# Patient Record
Sex: MALE | Race: OTHER | Employment: FULL TIME | URBAN - METROPOLITAN AREA
[De-identification: names, ages, dates, MRNs, and addresses within clinical notes are randomized per-mention and may not be internally consistent; named-entity substitution may affect disease eponyms.]

---

## 2021-06-17 ENCOUNTER — OFFICE VISIT (OUTPATIENT)
Dept: INTERNAL MEDICINE CLINIC | Facility: CLINIC | Age: 26
End: 2021-06-17
Payer: COMMERCIAL

## 2021-06-17 VITALS
HEIGHT: 60 IN | SYSTOLIC BLOOD PRESSURE: 122 MMHG | TEMPERATURE: 99 F | WEIGHT: 210.69 LBS | HEART RATE: 87 BPM | RESPIRATION RATE: 14 BRPM | BODY MASS INDEX: 41.37 KG/M2 | OXYGEN SATURATION: 98 % | DIASTOLIC BLOOD PRESSURE: 76 MMHG

## 2021-06-17 DIAGNOSIS — M41.9 SCOLIOSIS OF THORACOLUMBAR SPINE, UNSPECIFIED SCOLIOSIS TYPE: ICD-10-CM

## 2021-06-17 DIAGNOSIS — R22.9 MULTIPLE SKIN NODULES: Primary | ICD-10-CM

## 2021-06-17 DIAGNOSIS — E78.5 HYPERLIPIDEMIA, UNSPECIFIED HYPERLIPIDEMIA TYPE: ICD-10-CM

## 2021-06-17 DIAGNOSIS — Z00.00 PHYSICAL EXAM, ANNUAL: ICD-10-CM

## 2021-06-17 PROCEDURE — 99204 OFFICE O/P NEW MOD 45 MIN: CPT | Performed by: INTERNAL MEDICINE

## 2021-06-17 PROCEDURE — 3078F DIAST BP <80 MM HG: CPT | Performed by: INTERNAL MEDICINE

## 2021-06-17 PROCEDURE — 3008F BODY MASS INDEX DOCD: CPT | Performed by: INTERNAL MEDICINE

## 2021-06-17 PROCEDURE — 3074F SYST BP LT 130 MM HG: CPT | Performed by: INTERNAL MEDICINE

## 2021-06-17 NOTE — PROGRESS NOTES
Martinez Schrader    CHIEF COMPLAINT:  Patient presents with:  Derm Problem: Small bumps on back. - No color No itching        HISTORY OF PRESENT ILLNESS:  The patient is a 27year old year old male who presents to establish care.  New pt to office tosanya Smoking status: Never Smoker      Smokeless tobacco: Never Used    Vaping Use      Vaping Use: Never used    Substance and Sexual Activity      Alcohol use: Yes        Comment: Once per week      Drug use: Never      Sexual activity: Not on file    Other REVIEW OF SYSTEMS:  GENERAL: feels well otherwise  SKIN: see hpi  EYES:denies blurred vision or double vision  HEENT: denies nasal congestion, sinus pain or ST  LUNGS: denies shortness of breath with exertion  CARDIOVASCULAR: denies chest pain on ex back.   - US BACK LOWER/ABD WALL(CPT=76705); Future    2. Scoliosis of thoracolumbar spine, unspecified scoliosis type  - XR SCOLIOSIS SPINE 1 VIEW SH(CPT=72081); Future    3.  Physical exam, annual  Do labs prior to cpx.   - CBC WITH DIFFERENTIAL WITH PLAT

## 2021-06-23 ENCOUNTER — MED REC SCAN ONLY (OUTPATIENT)
Dept: INTERNAL MEDICINE CLINIC | Facility: CLINIC | Age: 26
End: 2021-06-23

## 2021-07-14 ENCOUNTER — HOSPITAL ENCOUNTER (OUTPATIENT)
Dept: ULTRASOUND IMAGING | Age: 26
Discharge: HOME | End: 2021-07-14
Attending: INTERNAL MEDICINE
Payer: COMMERCIAL

## 2021-07-14 DIAGNOSIS — R22.9 MULTIPLE SKIN NODULES: ICD-10-CM

## 2021-07-14 PROCEDURE — 76705 ECHO EXAM OF ABDOMEN: CPT | Performed by: INTERNAL MEDICINE

## 2021-08-09 ENCOUNTER — HOSPITAL ENCOUNTER (OUTPATIENT)
Dept: GENERAL RADIOLOGY | Age: 26
Discharge: HOME | End: 2021-08-09
Attending: INTERNAL MEDICINE
Payer: COMMERCIAL

## 2021-08-09 ENCOUNTER — OFFICE VISIT (OUTPATIENT)
Dept: INTERNAL MEDICINE CLINIC | Facility: CLINIC | Age: 26
End: 2021-08-09
Payer: COMMERCIAL

## 2021-08-09 ENCOUNTER — LAB ENCOUNTER (OUTPATIENT)
Dept: LAB | Age: 26
End: 2021-08-09
Attending: INTERNAL MEDICINE
Payer: COMMERCIAL

## 2021-08-09 VITALS
HEIGHT: 67.5 IN | DIASTOLIC BLOOD PRESSURE: 70 MMHG | RESPIRATION RATE: 12 BRPM | TEMPERATURE: 98 F | BODY MASS INDEX: 32.89 KG/M2 | WEIGHT: 212 LBS | HEART RATE: 64 BPM | SYSTOLIC BLOOD PRESSURE: 124 MMHG

## 2021-08-09 DIAGNOSIS — M41.9 SCOLIOSIS OF THORACOLUMBAR SPINE, UNSPECIFIED SCOLIOSIS TYPE: ICD-10-CM

## 2021-08-09 DIAGNOSIS — Z00.00 PHYSICAL EXAM, ANNUAL: Primary | ICD-10-CM

## 2021-08-09 DIAGNOSIS — Z23 NEED FOR VACCINATION: ICD-10-CM

## 2021-08-09 DIAGNOSIS — E78.5 HYPERLIPIDEMIA, UNSPECIFIED HYPERLIPIDEMIA TYPE: ICD-10-CM

## 2021-08-09 DIAGNOSIS — Z00.00 PHYSICAL EXAM, ANNUAL: ICD-10-CM

## 2021-08-09 LAB
ALBUMIN SERPL-MCNC: 4.3 G/DL (ref 3.4–5)
ALBUMIN/GLOB SERPL: 1.2 {RATIO} (ref 1–2)
ALP LIVER SERPL-CCNC: 75 U/L
ALT SERPL-CCNC: 37 U/L
ANION GAP SERPL CALC-SCNC: 2 MMOL/L (ref 0–18)
AST SERPL-CCNC: 20 U/L (ref 15–37)
BASOPHILS # BLD AUTO: 0.05 X10(3) UL (ref 0–0.2)
BASOPHILS NFR BLD AUTO: 0.9 %
BILIRUB SERPL-MCNC: 0.4 MG/DL (ref 0.1–2)
BUN BLD-MCNC: 12 MG/DL (ref 7–18)
CALCIUM BLD-MCNC: 9.2 MG/DL (ref 8.5–10.1)
CHLORIDE SERPL-SCNC: 109 MMOL/L (ref 98–112)
CHOLEST SMN-MCNC: 185 MG/DL (ref ?–200)
CO2 SERPL-SCNC: 28 MMOL/L (ref 21–32)
CREAT BLD-MCNC: 0.93 MG/DL
EOSINOPHIL # BLD AUTO: 0.03 X10(3) UL (ref 0–0.7)
EOSINOPHIL NFR BLD AUTO: 0.5 %
ERYTHROCYTE [DISTWIDTH] IN BLOOD BY AUTOMATED COUNT: 12.9 %
EST. AVERAGE GLUCOSE BLD GHB EST-MCNC: 108 MG/DL (ref 68–126)
GLOBULIN PLAS-MCNC: 3.7 G/DL (ref 2.8–4.4)
GLUCOSE BLD-MCNC: 89 MG/DL (ref 70–99)
HBA1C MFR BLD HPLC: 5.4 % (ref ?–5.7)
HCT VFR BLD AUTO: 42 %
HDLC SERPL-MCNC: 31 MG/DL (ref 40–59)
HGB BLD-MCNC: 13.8 G/DL
IMM GRANULOCYTES # BLD AUTO: 0.02 X10(3) UL (ref 0–1)
IMM GRANULOCYTES NFR BLD: 0.3 %
LDLC SERPL CALC-MCNC: 118 MG/DL (ref ?–100)
LYMPHOCYTES # BLD AUTO: 1.76 X10(3) UL (ref 1–4)
LYMPHOCYTES NFR BLD AUTO: 30.3 %
M PROTEIN MFR SERPL ELPH: 8 G/DL (ref 6.4–8.2)
MCH RBC QN AUTO: 27.3 PG (ref 26–34)
MCHC RBC AUTO-ENTMCNC: 32.9 G/DL (ref 31–37)
MCV RBC AUTO: 83.2 FL
MONOCYTES # BLD AUTO: 0.36 X10(3) UL (ref 0.1–1)
MONOCYTES NFR BLD AUTO: 6.2 %
NEUTROPHILS # BLD AUTO: 3.59 X10 (3) UL (ref 1.5–7.7)
NEUTROPHILS # BLD AUTO: 3.59 X10(3) UL (ref 1.5–7.7)
NEUTROPHILS NFR BLD AUTO: 61.8 %
NONHDLC SERPL-MCNC: 154 MG/DL (ref ?–130)
OSMOLALITY SERPL CALC.SUM OF ELEC: 287 MOSM/KG (ref 275–295)
PATIENT FASTING Y/N/NP: YES
PATIENT FASTING Y/N/NP: YES
PLATELET # BLD AUTO: 229 10(3)UL (ref 150–450)
POTASSIUM SERPL-SCNC: 4.2 MMOL/L (ref 3.5–5.1)
RBC # BLD AUTO: 5.05 X10(6)UL
SODIUM SERPL-SCNC: 139 MMOL/L (ref 136–145)
TRIGL SERPL-MCNC: 201 MG/DL (ref 30–149)
TSI SER-ACNC: 1.13 MIU/ML (ref 0.36–3.74)
VLDLC SERPL CALC-MCNC: 35 MG/DL (ref 0–30)
WBC # BLD AUTO: 5.8 X10(3) UL (ref 4–11)

## 2021-08-09 PROCEDURE — 80061 LIPID PANEL: CPT

## 2021-08-09 PROCEDURE — 3078F DIAST BP <80 MM HG: CPT | Performed by: INTERNAL MEDICINE

## 2021-08-09 PROCEDURE — 3008F BODY MASS INDEX DOCD: CPT | Performed by: INTERNAL MEDICINE

## 2021-08-09 PROCEDURE — 36415 COLL VENOUS BLD VENIPUNCTURE: CPT

## 2021-08-09 PROCEDURE — 90715 TDAP VACCINE 7 YRS/> IM: CPT | Performed by: INTERNAL MEDICINE

## 2021-08-09 PROCEDURE — 83036 HEMOGLOBIN GLYCOSYLATED A1C: CPT

## 2021-08-09 PROCEDURE — 90471 IMMUNIZATION ADMIN: CPT | Performed by: INTERNAL MEDICINE

## 2021-08-09 PROCEDURE — 3074F SYST BP LT 130 MM HG: CPT | Performed by: INTERNAL MEDICINE

## 2021-08-09 PROCEDURE — 80053 COMPREHEN METABOLIC PANEL: CPT

## 2021-08-09 PROCEDURE — 84443 ASSAY THYROID STIM HORMONE: CPT

## 2021-08-09 PROCEDURE — 85025 COMPLETE CBC W/AUTO DIFF WBC: CPT

## 2021-08-09 PROCEDURE — 99395 PREV VISIT EST AGE 18-39: CPT | Performed by: INTERNAL MEDICINE

## 2021-08-09 PROCEDURE — 72081 X-RAY EXAM ENTIRE SPI 1 VW: CPT | Performed by: INTERNAL MEDICINE

## 2021-08-09 NOTE — PROGRESS NOTES
[facility]    CHIEF COMPLAINT: Patient presents with:  Routine Physical         HPI:   Gauri Ricks is a 27year old male who presents for a complete physical exam.      Has not had labs done yet. Plans to do them today.  Also t SYSTEMS:   GENERAL: feels well otherwise  SKIN: denies any unusual skin lesions  EYES:denies blurred vision or double vision  HEENT: denies nasal congestion, sinus pain or ST  LUNGS: denies shortness of breath with exertion  CARDIOVASCULAR: denies chest pa exercise. Needs to see me if has any exertional chest pain or shortness of breath. No eye abnormalities today. See me when he feels his eye is smaller. No vision changes. tdap today.      2. Need for vaccination  - TETANUS, DIPHTHERIA TOXOIDS AND ACELLU

## 2021-08-11 ENCOUNTER — PATIENT MESSAGE (OUTPATIENT)
Dept: INTERNAL MEDICINE CLINIC | Facility: CLINIC | Age: 26
End: 2021-08-11

## 2021-08-12 NOTE — TELEPHONE ENCOUNTER
From: Poornima Victoria  To: Jennyfer Lugo MD  Sent: 8/11/2021 6:56 PM CDT  Subject: Other    Hello Dr. Bob Roldan,     Here is the picture of my COVID-19 Vaccine card you requested.

## 2021-08-12 NOTE — TELEPHONE ENCOUNTER
Immunization record updated based on the Covid card sent via [employer], pt notified records have been updated.

## 2022-04-01 ENCOUNTER — OFFICE VISIT (OUTPATIENT)
Dept: INTERNAL MEDICINE CLINIC | Facility: CLINIC | Age: 27
End: 2022-04-01
Payer: COMMERCIAL

## 2022-04-01 VITALS
DIASTOLIC BLOOD PRESSURE: 78 MMHG | BODY MASS INDEX: 35 KG/M2 | OXYGEN SATURATION: 97 % | RESPIRATION RATE: 16 BRPM | HEART RATE: 81 BPM | SYSTOLIC BLOOD PRESSURE: 128 MMHG | TEMPERATURE: 99 F | WEIGHT: 226.38 LBS

## 2022-04-01 DIAGNOSIS — R22.9 MULTIPLE SKIN NODULES: Primary | ICD-10-CM

## 2022-04-01 PROCEDURE — 3078F DIAST BP <80 MM HG: CPT | Performed by: NURSE PRACTITIONER

## 2022-04-01 PROCEDURE — 3074F SYST BP LT 130 MM HG: CPT | Performed by: NURSE PRACTITIONER

## 2022-04-01 PROCEDURE — 99213 OFFICE O/P EST LOW 20 MIN: CPT | Performed by: NURSE PRACTITIONER

## 2023-06-02 ENCOUNTER — OFFICE VISIT (OUTPATIENT)
Dept: INTERNAL MEDICINE CLINIC | Facility: CLINIC | Age: 28
End: 2023-06-02
Payer: COMMERCIAL

## 2023-06-02 VITALS
DIASTOLIC BLOOD PRESSURE: 66 MMHG | RESPIRATION RATE: 20 BRPM | BODY MASS INDEX: 33.82 KG/M2 | OXYGEN SATURATION: 97 % | SYSTOLIC BLOOD PRESSURE: 100 MMHG | TEMPERATURE: 98 F | WEIGHT: 218 LBS | HEIGHT: 67.5 IN | HEART RATE: 67 BPM

## 2023-06-02 DIAGNOSIS — Z13.29 SCREENING FOR THYROID DISORDER: ICD-10-CM

## 2023-06-02 DIAGNOSIS — Z00.00 ENCOUNTER FOR ANNUAL PHYSICAL EXAM: Primary | ICD-10-CM

## 2023-06-02 DIAGNOSIS — Z13.1 SCREENING FOR DIABETES MELLITUS: ICD-10-CM

## 2023-06-02 DIAGNOSIS — Z13.220 SCREENING FOR LIPID DISORDERS: ICD-10-CM

## 2023-06-02 PROCEDURE — 3078F DIAST BP <80 MM HG: CPT | Performed by: NURSE PRACTITIONER

## 2023-06-02 PROCEDURE — 3074F SYST BP LT 130 MM HG: CPT | Performed by: NURSE PRACTITIONER

## 2023-06-02 PROCEDURE — 99395 PREV VISIT EST AGE 18-39: CPT | Performed by: NURSE PRACTITIONER

## 2023-06-02 PROCEDURE — 3008F BODY MASS INDEX DOCD: CPT | Performed by: NURSE PRACTITIONER

## 2023-06-02 RX ORDER — FEXOFENADINE HCL 180 MG/1
180 TABLET ORAL AS NEEDED
COMMUNITY
Start: 2023-05-26

## 2023-06-02 NOTE — PATIENT INSTRUCTIONS
Get your labs done at [address] [place] [address] should be fasting for at least 10 hours. If you take a multivitamin with Biotin or any biotin product it should be held for 3 days prior to getting your labs done. Get the covid booster at your local pharmacy. Get the hpv vaccine series at local pharmacy or with our office.

## 2023-06-04 LAB
ABSOLUTE BASOPHILS: 60 CELLS/UL (ref 0–200)
ABSOLUTE EOSINOPHILS: 60 CELLS/UL (ref 15–500)
ABSOLUTE LYMPHOCYTES: 2322 CELLS/UL (ref 850–3900)
ABSOLUTE MONOCYTES: 396 CELLS/UL (ref 200–950)
ABSOLUTE NEUTROPHILS: 3162 CELLS/UL (ref 1500–7800)
ALBUMIN/GLOBULIN RATIO: 1.5 (CALC) (ref 1–2.5)
ALBUMIN: 4.8 G/DL (ref 3.6–5.1)
ALKALINE PHOSPHATASE: 69 U/L (ref 36–130)
ALT: 53 U/L (ref 9–46)
AST: 30 U/L (ref 10–40)
BASOPHILS: 1 %
BILIRUBIN, TOTAL: 0.6 MG/DL (ref 0.2–1.2)
BUN: 12 MG/DL (ref 7–25)
CALCIUM: 9.9 MG/DL (ref 8.6–10.3)
CARBON DIOXIDE: 27 MMOL/L (ref 20–32)
CHLORIDE: 105 MMOL/L (ref 98–110)
CHOL/HDLC RATIO: 7.4 (CALC)
CHOLESTEROL, TOTAL: 223 MG/DL
CREATININE: 0.97 MG/DL (ref 0.6–1.24)
EGFR: 110 ML/MIN/1.73M2
EOSINOPHILS: 1 %
GLOBULIN: 3.2 G/DL (CALC) (ref 1.9–3.7)
GLUCOSE: 91 MG/DL (ref 65–99)
HDL CHOLESTEROL: 30 MG/DL
HEMATOCRIT: 44.8 % (ref 38.5–50)
HEMOGLOBIN A1C: 5 % OF TOTAL HGB
HEMOGLOBIN: 15 G/DL (ref 13.2–17.1)
LYMPHOCYTES: 38.7 %
MCH: 29.2 PG (ref 27–33)
MCHC: 33.5 G/DL (ref 32–36)
MCV: 87.2 FL (ref 80–100)
MONOCYTES: 6.6 %
MPV: 11.1 FL (ref 7.5–12.5)
NEUTROPHILS: 52.7 %
NON-HDL CHOLESTEROL: 193 MG/DL (CALC)
PLATELET COUNT: 218 THOUSAND/UL (ref 140–400)
POTASSIUM: 4.4 MMOL/L (ref 3.5–5.3)
PROTEIN, TOTAL: 8 G/DL (ref 6.1–8.1)
RDW: 13.4 % (ref 11–15)
RED BLOOD CELL COUNT: 5.14 MILLION/UL (ref 4.2–5.8)
SODIUM: 141 MMOL/L (ref 135–146)
TRIGLYCERIDES: 507 MG/DL
TSH W/REFLEX TO FT4: 1.7 MIU/L (ref 0.4–4.5)
WHITE BLOOD CELL COUNT: 6 THOUSAND/UL (ref 3.8–10.8)

## 2023-06-05 DIAGNOSIS — E78.2 ELEVATED TRIGLYCERIDES WITH HIGH CHOLESTEROL: Primary | ICD-10-CM

## 2023-06-05 DIAGNOSIS — R74.01 ELEVATED ALT MEASUREMENT: ICD-10-CM

## 2024-05-17 ENCOUNTER — TELEPHONE (OUTPATIENT)
Dept: INTERNAL MEDICINE CLINIC | Facility: CLINIC | Age: 29
End: 2024-05-17

## 2024-05-17 NOTE — TELEPHONE ENCOUNTER
\"Can you please triage this appt. Mild chest discomfort is questionable, rather be safe than sorry.  Thanks\"      Called patient. Started about week ago. Rates 3/4 out of 10. Described as mild, in background, sometimes doesn't notice. Comes and goes. Does not worsen w/exertion. No radiation. Center of chest. No known trauma. No palp/sob. Took antacid kind of felt like help. Mother has heartburn. No nausea or excessive sweating. Doesn't know anything that  makes it worse. Spoke to provider. Advised okay to keep appoinment. Aware to go to [facility] in meantime for worsening or emergent symptoms. Aware. Verbalizes understanding.

## 2024-05-21 ENCOUNTER — OFFICE VISIT (OUTPATIENT)
Dept: INTERNAL MEDICINE CLINIC | Facility: CLINIC | Age: 29
End: 2024-05-21

## 2024-05-21 VITALS
DIASTOLIC BLOOD PRESSURE: 80 MMHG | BODY MASS INDEX: 31.22 KG/M2 | SYSTOLIC BLOOD PRESSURE: 120 MMHG | TEMPERATURE: 98 F | HEIGHT: 68.4 IN | OXYGEN SATURATION: 97 % | RESPIRATION RATE: 16 BRPM | WEIGHT: 208.38 LBS | HEART RATE: 74 BPM

## 2024-05-21 DIAGNOSIS — R07.89 ATYPICAL CHEST PAIN: Primary | ICD-10-CM

## 2024-05-21 DIAGNOSIS — R12 HEARTBURN: ICD-10-CM

## 2024-05-21 DIAGNOSIS — I45.10 INCOMPLETE RIGHT BUNDLE BRANCH BLOCK (RBBB): ICD-10-CM

## 2024-05-21 LAB
ATRIAL RATE: 78 BPM
P AXIS: 40 DEGREES
P-R INTERVAL: 168 MS
Q-T INTERVAL: 360 MS
QRS DURATION: 96 MS
QTC CALCULATION (BEZET): 410 MS
R AXIS: 21 DEGREES
T AXIS: 28 DEGREES
VENTRICULAR RATE: 78 BPM

## 2024-05-21 RX ORDER — OMEPRAZOLE 20 MG/1
20 CAPSULE, DELAYED RELEASE ORAL
Qty: 30 CAPSULE | Refills: 0 | Status: SHIPPED | OUTPATIENT
Start: 2024-05-21

## 2024-05-21 NOTE — PROGRESS NOTES
CHIEF COMPLAINT:     Chief Complaint   Patient presents with    Chest Pain     Started about week ago. Rates 3/4 out of 10. Described as mild, in background, sometimes doesn't notice. Comes and goes. Does not worsen w/exertion. Center of chest. No known trauma. No palp/sob. Took antacid kind of felt like help. No nausea or excessive sweating. Doesn't know anything that makes it worse.       HPI:   Manuel Victoria is a 29 year old male coming in with complaints of atypical chest pain from the past 1.5 weeks. Describes it as mild chest discomfort to the center of the chest, happens randomly. Can last anywhere from 30 minutes-1 hour. No radiation of pain. No associated shortness of breath, palpitations, diaphoresis, or syncope. He thinks it may be heartburn related but not sure. He did take tums which relieved his symptoms somewhat. He does eat a lot of spicy foods, sweets, and drinks coffee. No NSAID use. No N/V, abdominal pain, or change in BM.     Past Medical History:    Anxiety    Depression    Hyperlipidemia    Kidney stone      Past Surgical History:   Procedure Laterality Date    Adenoidectomy      at age 5    Hernia surgery N/A 2001      Social History:  Social History     Socioeconomic History    Marital status: Single   Occupational History    Occupation:    Tobacco Use    Smoking status: Never     Passive exposure: Never    Smokeless tobacco: Never   Vaping Use    Vaping status: Never Used   Substance and Sexual Activity    Alcohol use: Yes     Comment: Once per week    Drug use: Never   Other Topics Concern    Caffeine Concern Yes    Exercise Yes     Comment: jogging    Seat Belt Yes    Special Diet No    Stress Concern Yes    Weight Concern Yes      Family History:  Family History   Problem Relation Age of Onset    Lipids Father     Arthritis Mother     No Known Problems Maternal Grandmother     Diabetes Maternal Grandfather     No Known Problems Paternal Grandmother     No  Known Problems Paternal Grandfather     No Known Problems Brother       Allergies:  Allergies   Allergen Reactions    Seasonal OTHER (SEE COMMENTS)     Itchy watery eyes and runny nose      Current Meds:  Current Outpatient Medications   Medication Sig Dispense Refill    omeprazole 20 MG Oral Capsule Delayed Release Take 1 capsule (20 mg total) by mouth every morning before breakfast. 30 capsule 0    fexofenadine (ALLEGRA ALLERGY) 180 MG Oral Tab Take 1 tablet (180 mg total) by mouth as needed.      diphenhydrAMINE HCl (BENADRYL ALLERGY OR) Take 1 tablet by mouth as needed.         Counseling given: Not Answered       REVIEW OF SYSTEMS:   See HPI.    EXAM:     /80 (BP Location: Left arm, Patient Position: Sitting, Cuff Size: adult)   Pulse 74   Temp 98.3 °F (36.8 °C) (Temporal)   Resp 16   Ht 5' 8.4\" (1.737 m)   Wt 208 lb 6.4 oz (94.5 kg)   SpO2 97%   BMI 31.32 kg/m²   Body mass index is 31.32 kg/m².   Vital signs reviewed. Appears stated age, well groomed, in no acute distress.  Physical Exam:  GENERAL: Patient is alert, awake and oriented, well developed, well nourished.  HEENT: Head: Normocephalic, atraumatic.   NECK: Supple, no CLAD, no bruits.  HEART: RRR without murmur.  LUNGS: Clear to auscultation bilaterally, no rales/rhonchi/wheezing.  ABDOMEN: good BS's, no masses, HSM or tenderness  MUSCULOSKELETAL: No obvious joint deformity or swelling.   EXTREMITIES: No edema, no cyanosis, no clubbing, FROM  NEURO: Oriented time three.    LABS:      Lab Results   Component Value Date    WBC 6.0 06/03/2023    RBC 5.14 06/03/2023    HGB 15.0 06/03/2023    HCT 44.8 06/03/2023    MCV 87.2 06/03/2023    MCH 29.2 06/03/2023    MCHC 33.5 06/03/2023    RDW 13.4 06/03/2023     06/03/2023      Lab Results   Component Value Date    GLU 91 06/03/2023    BUN 12 06/03/2023    BUNCREA NOT APPLICABLE 06/03/2023    CREATSERUM 0.97 06/03/2023    ANIONGAP 2 08/09/2021    GFRNAA 114 08/09/2021    GFRAA 132 08/09/2021     CA 9.9 06/03/2023    OSMOCALC 287 08/09/2021    ALKPHO 69 06/03/2023    AST 30 06/03/2023    ALT 53 (H) 06/03/2023    BILT 0.6 06/03/2023    TP 8.0 06/03/2023    ALB 4.8 06/03/2023    GLOBULIN 3.2 06/03/2023    AGRATIO 1.5 06/03/2023     06/03/2023    K 4.4 06/03/2023     06/03/2023    CO2 27 06/03/2023      Lab Results   Component Value Date    CHOLEST 223 (H) 06/03/2023    TRIG 507 (H) 06/03/2023    HDL 30 (L) 06/03/2023    LDL  06/03/2023      Comment:         LDL cholesterol not calculated. Triglyceride levels  greater than 400 mg/dL invalidate calculated LDL results.     Reference range: <100     Desirable range <100 mg/dL for primary prevention;    <70 mg/dL for patients with CHD or diabetic patients   with > or = 2 CHD risk factors.     LDL-C is now calculated using the Elias-Claudio   calculation, which is a validated novel method providing   better accuracy than the Friedewald equation in the   estimation of LDL-C.   Elias SS et al. MALU. 2013;310(19): 0962-1613   (http://education.Zoom.Indix/faq/SDJ592)      VLDL 35 (H) 08/09/2021    TCHDLRATIO 7.4 (H) 06/03/2023    NONHDLC 193 (H) 06/03/2023      Lab Results   Component Value Date    TSH 1.130 08/09/2021    TSHT4 1.70 06/03/2023      Lab Results   Component Value Date     08/09/2021    A1C 5.0 06/03/2023        IMAGING:     EKG 5/21/24: NSR, no acute ST changes, normal axis. Incomplete RBBB.     ASSESSMENT AND PLAN:   1. Atypical chest pain  -EKG as above  -Likely related to heartburn/GERD  -Will have him do echo and stress test to r/o cardiac etiology   -Go to ER for worsening chest pain or shortness of breath   - EKG with interpretation and Report -IN OFFICE [[id number]]  - CARD ECHO 2D DOPPLER (CPT=93421); Future  - CARD TREADMILL STRESS, ADULT (CPT=93017); Future    2. Incomplete right bundle branch block (RBBB)  -Do echo and stress test   - CARD ECHO 2D DOPPLER (CPT=93306); Future  - CARD TREADMILL STRESS, ADULT  (CPT=93017); Future    3. Heartburn  -Trial Omeprazole 20 mg Qam x 1 month. Weaning instructions given to patient  -Antireflux precautions discussed with patient  - omeprazole 20 MG Oral Capsule Delayed Release; Take 1 capsule (20 mg total) by mouth every morning before breakfast.  Dispense: 30 capsule; Refill: 0     The patient indicates understanding of these issues and agrees to the plan.  Return in about 2 weeks (around 6/4/2024) for physical.    Bettie Jansen, ELEUTERIO  5/21/2024

## 2024-05-22 PROBLEM — I45.10 INCOMPLETE RIGHT BUNDLE BRANCH BLOCK (RBBB): Status: ACTIVE | Noted: 2024-05-22

## 2024-06-20 ENCOUNTER — HOSPITAL ENCOUNTER (OUTPATIENT)
Dept: CV DIAGNOSTICS | Facility: HOSPITAL | Age: 29
Discharge: HOME | End: 2024-06-20
Attending: NURSE PRACTITIONER

## 2024-06-20 DIAGNOSIS — R07.89 ATYPICAL CHEST PAIN: ICD-10-CM

## 2024-06-20 DIAGNOSIS — I45.10 INCOMPLETE RIGHT BUNDLE BRANCH BLOCK (RBBB): ICD-10-CM

## 2024-06-20 PROCEDURE — 93018 CV STRESS TEST I&R ONLY: CPT | Performed by: NURSE PRACTITIONER

## 2024-06-20 PROCEDURE — 93017 CV STRESS TEST TRACING ONLY: CPT | Performed by: NURSE PRACTITIONER

## 2024-06-20 PROCEDURE — 93306 TTE W/DOPPLER COMPLETE: CPT | Performed by: NURSE PRACTITIONER

## 2024-07-02 ENCOUNTER — OFFICE VISIT (OUTPATIENT)
Dept: INTERNAL MEDICINE CLINIC | Facility: CLINIC | Age: 29
End: 2024-07-02
Payer: COMMERCIAL

## 2024-07-02 VITALS
BODY MASS INDEX: 31.34 KG/M2 | SYSTOLIC BLOOD PRESSURE: 120 MMHG | HEART RATE: 62 BPM | OXYGEN SATURATION: 99 % | DIASTOLIC BLOOD PRESSURE: 82 MMHG | HEIGHT: 67.64 IN | TEMPERATURE: 98 F | WEIGHT: 204.38 LBS | RESPIRATION RATE: 16 BRPM

## 2024-07-02 DIAGNOSIS — K21.9 GASTROESOPHAGEAL REFLUX DISEASE, UNSPECIFIED WHETHER ESOPHAGITIS PRESENT: ICD-10-CM

## 2024-07-02 DIAGNOSIS — Z13.1 SCREENING FOR DIABETES MELLITUS: ICD-10-CM

## 2024-07-02 DIAGNOSIS — Z13.228 SCREENING FOR ENDOCRINE, METABOLIC AND IMMUNITY DISORDER: ICD-10-CM

## 2024-07-02 DIAGNOSIS — F32.A ANXIETY AND DEPRESSION: ICD-10-CM

## 2024-07-02 DIAGNOSIS — Z13.29 SCREENING FOR THYROID DISORDER: ICD-10-CM

## 2024-07-02 DIAGNOSIS — Z13.0 SCREENING FOR DEFICIENCY ANEMIA: ICD-10-CM

## 2024-07-02 DIAGNOSIS — E78.1 HYPERTRIGLYCERIDEMIA: ICD-10-CM

## 2024-07-02 DIAGNOSIS — Z00.00 ENCOUNTER FOR ANNUAL PHYSICAL EXAM: Primary | ICD-10-CM

## 2024-07-02 DIAGNOSIS — Z13.0 SCREENING FOR ENDOCRINE, METABOLIC AND IMMUNITY DISORDER: ICD-10-CM

## 2024-07-02 DIAGNOSIS — Z13.29 SCREENING FOR ENDOCRINE, METABOLIC AND IMMUNITY DISORDER: ICD-10-CM

## 2024-07-02 DIAGNOSIS — F41.9 ANXIETY AND DEPRESSION: ICD-10-CM

## 2024-07-02 PROCEDURE — 99395 PREV VISIT EST AGE 18-39: CPT | Performed by: NURSE PRACTITIONER

## 2024-07-02 NOTE — PROGRESS NOTES
CHIEF COMPLAINT     Chief Complaint   Patient presents with    Physical     HPI:   Manuel Fuentes Vincent Victoria is a 29 year old male who presents for a complete physical exam.    Diet is good. Exercise is going to gym 2-3 times/weekly. Alcohol use is 2-3 drinks/weekly. No smoking. He works as a . He has a SO.       Labs to be ordered. Vaccines reviewed. UTD with tetanus and flu vaccine. Due for covid booster. Wears seatbelt. No texting and driving. No skin concerns.    His chest pain is now resolved. Echo and stress test unremarkable. He did take the omeprazole for 1 month with improvement in his symptoms. Does get heartburn occasionally but not as frequent as before.     Anxiety/depression: anxiety slightly worse due to elevated TGL last year, forgot to repeat it. Will do labs now as ordered. No SHIP. Working with a therapist and will be making an appointment with psychiatrist to possibly start medications. PHQ-9: 3, GEOFFREY-7: 12, CSSR: No risk.     Wt Readings from Last 6 Encounters:   07/02/24 204 lb 6.4 oz (92.7 kg)   05/21/24 208 lb 6.4 oz (94.5 kg)   06/02/23 218 lb (98.9 kg)   04/01/22 226 lb 6.4 oz (102.7 kg)   08/09/21 212 lb (96.2 kg)   06/17/21 210 lb 11.2 oz (95.6 kg)     Body mass index is 31.41 kg/m².     Cholesterol, Total (mg/dL)   Date Value   08/09/2021 185     CHOLESTEROL, TOTAL (mg/dL)   Date Value   06/03/2023 223 (H)     HDL Cholesterol (mg/dL)   Date Value   08/09/2021 31 (L)     HDL CHOLESTEROL (mg/dL)   Date Value   06/03/2023 30 (L)     LDL Cholesterol (mg/dL)   Date Value   08/09/2021 118 (H)     LDL-CHOLESTEROL (mg/dL (calc))   Date Value   06/03/2023      Comment:        LDL cholesterol not calculated. Triglyceride levels  greater than 400 mg/dL invalidate calculated LDL results.     Reference range: <100     Desirable range <100 mg/dL for primary prevention;    <70 mg/dL for patients with CHD or diabetic patients   with > or = 2 CHD risk factors.     LDL-C is now calculated  using the Lydia   calculation, which is a validated novel method providing   better accuracy than the Friedewald equation in the   estimation of LDL-C.   Elias VALENZUELA et al. MALU. 2013;310(19): 2521-2027   (http://education.[employer]/faq/ZSB987)       AST (U/L)   Date Value   06/03/2023 30   08/09/2021 20     ALT (U/L)   Date Value   06/03/2023 53 (H)   08/09/2021 37      Current Outpatient Medications   Medication Sig Dispense Refill    omeprazole 20 MG Oral Capsule Delayed Release Take 1 capsule (20 mg total) by mouth every morning before breakfast. (Patient taking differently: Take 1 capsule (20 mg total) by mouth daily as needed.) 30 capsule 0    fexofenadine (ALLEGRA ALLERGY) 180 MG Oral Tab Take 1 tablet (180 mg total) by mouth as needed.      diphenhydrAMINE HCl (BENADRYL ALLERGY OR) Take 1 tablet by mouth as needed.        Allergies   Allergen Reactions    Seasonal OTHER (SEE COMMENTS)     Itchy watery eyes and runny nose      Past Medical History:    Anxiety    Depression    Hyperlipidemia    Kidney stone      Past Surgical History:   Procedure Laterality Date    Adenoidectomy      at age 5    Hernia surgery N/A 2001      Family History   Problem Relation Age of Onset    Lipids Father     Arthritis Mother     No Known Problems Maternal Grandmother     Diabetes Maternal Grandfather     No Known Problems Paternal Grandmother     No Known Problems Paternal Grandfather     No Known Problems Brother       Social History:  Social History     Socioeconomic History    Marital status: Single   Occupational History    Occupation:    Tobacco Use    Smoking status: Never     Passive exposure: Never    Smokeless tobacco: Never   Vaping Use    Vaping status: Never Used   Substance and Sexual Activity    Alcohol use: Yes     Comment: Once per week    Drug use: Never   Other Topics Concern    Caffeine Concern Yes    Exercise Yes     Comment: jogging    Seat Belt Yes    Special Diet No    Stress  Concern Yes    Weight Concern Yes         REVIEW OF SYSTEMS:   GENERAL: feels well otherwise  SKIN: no complaint of any unusual skin lesions  EYES: no complaint of blurred vision or double vision  HEENT: no complaint of nasal congestion, sinus pain or ST  LUNGS: no complaint of shortness of breath with exertion  CARDIOVASCULAR: no complaint of chest pain on exertion  GI: no complaint of pain, denies heartburn  : no complaint of nocturia or changes in stream. He is sexually active with his SO, no concerns for STDs.   MUSCULOSKELETAL: no complaint of back pain  NEURO: no complaint of headaches  PSYCHE: see HPI  HEMATOLOGIC: denies hx of anemia  ENDOCRINE: denies thyroid history   ALL/ASTHMA: denies hx of allergy or asthma     EXAM:   /82 (BP Location: Left arm, Patient Position: Sitting, Cuff Size: adult)   Pulse 62   Temp 97.9 °F (36.6 °C) (Temporal)   Resp 16   Ht 5' 7.64\" (1.718 m)   Wt 204 lb 6.4 oz (92.7 kg)   SpO2 99%   BMI 31.41 kg/m²   Body mass index is 31.41 kg/m².   GENERAL: well developed, well nourished, in no apparent distress  SKIN: no rashes, no suspicious lesions  HEENT: atraumatic, normocephalic, ears are clear  EYES: PERRLA, EOMI, conjunctiva are clear  NECK: supple, no adenopathy, no thyromegaly   LUNGS: clear to auscultation  CARDIO: RRR without murmur  GI: good BS's, no masses, HSM or tenderness  MUSCULOSKELETAL: back is not tender, FROM of the back  EXTREMITIES: no cyanosis, clubbing or edema  NEURO: oriented times three, cranial nerves are grossly intact, motor and sensory are grossly intact    LABS:     Lab Results   Component Value Date    WBC 6.0 06/03/2023    RBC 5.14 06/03/2023    HGB 15.0 06/03/2023    HCT 44.8 06/03/2023    MCV 87.2 06/03/2023    MCH 29.2 06/03/2023    MCHC 33.5 06/03/2023    RDW 13.4 06/03/2023     06/03/2023      Lab Results   Component Value Date    GLU 91 06/03/2023    BUN 12 06/03/2023    BUNCREA NOT APPLICABLE 06/03/2023    CREATSERUM 0.97  06/03/2023    ANIONGAP 2 08/09/2021    GFRNAA 114 08/09/2021    GFRAA 132 08/09/2021    CA 9.9 06/03/2023    OSMOCALC 287 08/09/2021    ALKPHO 69 06/03/2023    AST 30 06/03/2023    ALT 53 (H) 06/03/2023    BILT 0.6 06/03/2023    TP 8.0 06/03/2023    ALB 4.8 06/03/2023    GLOBULIN 3.2 06/03/2023    AGRATIO 1.5 06/03/2023     06/03/2023    K 4.4 06/03/2023     06/03/2023    CO2 27 06/03/2023      Lab Results   Component Value Date    CHOLEST 223 (H) 06/03/2023    TRIG 507 (H) 06/03/2023    HDL 30 (L) 06/03/2023    LDL  06/03/2023      Comment:         LDL cholesterol not calculated. Triglyceride levels  greater than 400 mg/dL invalidate calculated LDL results.     Reference range: <100     Desirable range <100 mg/dL for primary prevention;    <70 mg/dL for patients with CHD or diabetic patients   with > or = 2 CHD risk factors.     LDL-C is now calculated using the Elias-Claudio   calculation, which is a validated novel method providing   better accuracy than the Friedewald equation in the   estimation of LDL-C.   Elias SS et al. MALU. 2013;310(19): 5470-7851   (http://education.[employer].com/faq/ZWT989)      VLDL 35 (H) 08/09/2021    TCHDLRATIO 7.4 (H) 06/03/2023    NONHDLC 193 (H) 06/03/2023      Lab Results   Component Value Date    TSH 1.130 08/09/2021    TSHT4 1.70 06/03/2023      Lab Results   Component Value Date     08/09/2021    A1C 5.0 06/03/2023       IMAGING:     CARD TREADMILL STRESS, ADULT (ZUG=48354)    Result Date: 6/20/2024  Table formatting from the original result was not included. PATIENT'S NAME:   Manuel Victoria ORDERING PHYSICIAN: Bettie Jansen APRN  PRIMARY CARE PHYSICIAN:  Prudence Parkinson MD    MEDICAL RECORD #:   [id number]    PATIENT ACCOUNT#:  [id number] YOB: 1995 AGE AT TEST:    29 year old DATE OF EXAM:  6/20/24 INDICATION:   Atypical chest pain, Incomplete right bundle branch block (RBBB)    TREADMILL REPORT Resting EKG:   Normal  sinus rhythm, right bundle branch block. STAGE    BP HR REST 118/70 77 1 120/70 100 2 140/70 106 3 150/70 134 4 180/70 164 5   Peak 180/70 179 Immediate    2 minute post 140/70 114 5 minute post    RESULTS  Maximal predicted heart rate: 193 Target heart rate (90%): 173 % achieved: 93% EKG Response: Normal Reason for stopping test: Fatigue Duration of exercise: 12:12 DEMOGRAPHICS:  Gender:  Male. Height: 68 inches.  Weight: 208 pounds. INDICATIONS:  Atypical chest pain, incomplete right bundle branch block. EXERCISE FINDINGS:  The patient exercised for 12 minutes and 12 seconds on a Waqar protocol achieving 10.0 METs, a maximal blood pressure of 180/70 mmHg and a maximum heart rate of 179 beats per minute. Exercise was discontinued due to fatigue. The electrocardiographic response to exercise was normal.  No arrhythmias noted. The patient denied cardiac symptoms.  The patient's overall exercise tolerance was  average achieving 93% of the maximal predicted heart rate. IMPRESSION:  Normal exercise ECG. stephanie Cordero MD 2024 t  [id number] 2024 12:42 PM cc:    CARD ECHO 2D DOPPLER (CPT=93306)    Result Date: 2024  Transthoracic Echocardiogram Name:Bridger Durano Adolfo Date: 2024 :  1995 Ht:  (68in)  BP: 134 / 80 MRN:  [id number]    Age:  29years    Wt:  (208lb) HR: 56bpm Loc:  [facility]       Gndr: M          BSA: 2.08m^2 Sonographer: Jaclyn BOOTH Ordering:    Bettie Jansen Referring:   Bettie Jansen ---------------------------------------------------------------------------- History/Indications:   Chest pain. Incomplete right bundle branch block. ---------------------------------------------------------------------------- Procedure information:  A transthoracic complete 2D study was performed. Additional evaluation included M-mode, complete spectral Doppler, and color Doppler.  Patient status:  Outpatient.  Location:  Echo laboratory.    No prior study was available for comparison.     This was a routine study. Transthoracic echocardiography for ventricular function evaluation and assessment of valvular function. Image quality was adequate. ECG rhythm:   Sinus bradycardia ---------------------------------------------------------------------------- Conclusions: 1. Left ventricle: The cavity size was normal. Wall thickness was normal.    Systolic function was normal. The estimated ejection fraction was 60-65%,    by visual assessment. Wall motion is normal; there are no regional wall    motion abnormalities. Left ventricular diastolic function parameters were    normal. 2. Right ventricle: The cavity size was normal. Systolic function was    normal. 3. Pulmonary arteries: Systolic pressure was within the normal range, at    most 30mm Hg. Estimated pulmonary artery diastolic pressure was 9mm Hg. 4. Pericardium, extracardiac: There was no pericardial effusion. Impressions:  No previous study was available for comparison. * ---------------------------------------------------------------------------- * Findings: Left ventricle:  The cavity size was normal. Wall thickness was normal. Systolic function was normal. The estimated ejection fraction was 60-65%, by visual assessment. Wall motion is normal; there are no regional wall motion abnormalities. Left ventricular diastolic function parameters were normal. Left atrium:  The atrium was normal in size. The left atrial volume was normal. Right ventricle:  The cavity size was normal. Systolic function was normal. Right atrium:  The atrium was normal in size. Mitral valve:  The valve was structurally normal. Leaflet separation was normal.  Doppler:  Transvalvular velocity was within the normal range. There was no evidence for stenosis. There was no significant regurgitation. Aortic valve:  The valve was structurally normal. The valve was trileaflet. Cusp separation was normal.  Doppler:  Transvalvular velocity was within the normal range. There was no  evidence for stenosis. There was no significant regurgitation. Tricuspid valve:  The valve is structurally normal. Leaflet separation was normal.  Doppler:  Transvalvular velocity was within the normal range. There was no evidence for stenosis. There was trivial regurgitation. Pulmonic valve:   The valve is structurally normal. Cusp separation was normal.  Doppler:  Transvalvular velocity was within the normal range. There was no evidence for stenosis. There was trivial regurgitation. Pericardium:   There was no pericardial effusion. Aorta: Aortic root: The aortic root was normal. Ascending aorta: The ascending aorta was normal. Pulmonary arteries: Systolic pressure was within the normal range, at most 30mm Hg. Estimated pulmonary artery diastolic pressure was 9mm Hg. Systemic veins:  Central venous respirophasic diameter changes are in the normal range (>50%). ---------------------------------------------------------------------------- Measurements  Left ventricle                   Value        Ref  IVS thickness, ED, PLAX          0.8   cm     0.6 - 1.0  LV ID, ED, PLAX                  5.4   cm     4.2 - 5.8  LV ID, ES, PLAX                  3.3   cm     2.5 - 4.0  LV PW thickness, ED, PLAX    (H) 1.2   cm     0.6 - 1.0  IVS/LV PW ratio, ED, PLAX        0.73         ---------  LV PW/LV ID ratio, ED, PLAX      0.22         ---------  LV ejection fraction             68    %      52 - 72  LV e', lateral                   11.4  cm/sec >=10.0  LV E/e', lateral                 7            <=13  LV e', medial                    9.4   cm/sec >=7.0  LV E/e', medial                  9            ---------  LV e', average                   10.4  cm/sec ---------  LV E/e', average                 8            <=14  Aortic root                      Value        Ref  Aortic root ID, ED               3.1   cm     2.8 - 3.8  Ascending aorta                  Value        Ref  Ascending aorta ID, A-P, ED      2.7   cm     2.2 -  3.8  Left atrium                      Value        Ref  LA ID, A-P, ES                   3.2   cm     3.0 - 4.0  LA volume, S                     29    ml     18 - 58  LA volume/bsa, S             (L) 14    ml/m^2 16 - 34  LA volume, ES, 1-p A4C           21    ml     18 - 58  LA volume, ES, 1-p A2C           40    ml     18 - 58  LA volume, ES, A/L               30    ml     ---------  LA volume/bsa, ES, A/L       (L) 14    ml/m^2 16 - 34  LA/aortic root ratio             1.03         ---------  Mitral valve                     Value        Ref  Mitral E-wave peak velocity      0.8   m/sec  ---------  Mitral A-wave peak velocity      0.48  m/sec  ---------  Mitral peak gradient, D          3     mm Hg  ---------  Mitral E/A ratio, peak           1.7          ---------  Right ventricle                  Value        Ref  TAPSE, 2D                        1.80  cm     >=1.70  RV s', lateral                   11.4  cm/sec >=9.5  Pulmonic valve                   Value        Ref  Pulmonic regurg velocity, ED     1     m/sec  ---------  Pulmonic regurg gradient, ED     4     mm Hg  --------- Legend: (L)  and  (H)  shyam values outside specified reference range. ---------------------------------------------------------------------------- Prepared and electronically signed by Serg Lane 06/20/2024 11:26        ASSESSMENT AND PLAN:     1. Encounter for annual physical exam  Manuel Victoria is a 29 year old male who presents for a complete physical exam. Pt's weight is Body mass index is 31.41 kg/m²., recommended regular exercise. The patient indicates understanding of these issues and agrees to the plan.  -Do fasting labs  -UTD with tetanus  -Due for covid booster, advised to get at local pharmacy  - CBC With Differential With Platelet  - Comp Metabolic Panel  - Lipid Panel  - Hemoglobin A1C  - TSH W Reflex To Free T4    2. Gastroesophageal reflux disease, unspecified whether esophagitis present  -Symptoms  stable now per patient  -Continue antireflux precautions    3. Anxiety and depression  -PHQ-9: 3, GEOFFREY-7: 12, CSSR: No risk  -Continue therapy  -See psychiatrist as planned    4. Hypertriglyceridemia  -Low carb diet and regular exercise  - Lipid Panel    5. Screening for deficiency anemia  - CBC With Differential With Platelet    6. Screening for endocrine, metabolic and immunity disorder  - Comp Metabolic Panel    7. Screening for diabetes mellitus  - Hemoglobin A1C    8. Screening for thyroid disorder  - TSH W Reflex To Free T4      Return in about 1 year (around 7/2/2025) for physical.    Bettie Jansen, APRN  7/2/2024

## 2024-07-22 ENCOUNTER — TELEPHONE (OUTPATIENT)
Dept: INTERNAL MEDICINE CLINIC | Facility: CLINIC | Age: 29
End: 2024-07-22

## 2024-07-22 DIAGNOSIS — D64.9 ANEMIA, UNSPECIFIED TYPE: Primary | ICD-10-CM

## 2024-07-22 NOTE — TELEPHONE ENCOUNTER
Incoming (mail or fax):  fax   Received from:  [facility]   Documentation given to:  results bin

## 2024-07-23 LAB
% SATURATION: 17 % (CALC) (ref 20–48)
ABSOLUTE BASOPHILS: 51 CELLS/UL (ref 0–200)
ABSOLUTE EOSINOPHILS: 41 CELLS/UL (ref 15–500)
ABSOLUTE LYMPHOCYTES: 1578 CELLS/UL (ref 850–3900)
ABSOLUTE MONOCYTES: 368 CELLS/UL (ref 200–950)
ABSOLUTE NEUTROPHILS: 2562 CELLS/UL (ref 1500–7800)
ALBUMIN/GLOBULIN RATIO: 1.5 (CALC) (ref 1–2.5)
ALBUMIN: 4.6 G/DL (ref 3.6–5.1)
ALKALINE PHOSPHATASE: 67 U/L (ref 36–130)
ALT: 19 U/L (ref 9–46)
AST: 17 U/L (ref 10–40)
BASOPHILS: 1.1 %
BILIRUBIN, TOTAL: 0.6 MG/DL (ref 0.2–1.2)
BUN: 11 MG/DL (ref 7–25)
CALCIUM: 9.7 MG/DL (ref 8.6–10.3)
CARBON DIOXIDE: 28 MMOL/L (ref 20–32)
CHLORIDE: 103 MMOL/L (ref 98–110)
CHOL/HDLC RATIO: 6.3 (CALC)
CHOLESTEROL, TOTAL: 195 MG/DL
CREATININE: 0.87 MG/DL (ref 0.6–1.24)
EGFR: 121 ML/MIN/1.73M2
EOSINOPHILS: 0.9 %
FERRITIN: 6 NG/ML (ref 38–380)
GLOBULIN: 3 G/DL (CALC) (ref 1.9–3.7)
GLUCOSE: 87 MG/DL (ref 65–99)
HDL CHOLESTEROL: 31 MG/DL
HEMATOCRIT: 40.8 % (ref 38.5–50)
HEMOGLOBIN A1C: 5.3 % OF TOTAL HGB
HEMOGLOBIN: 13 G/DL (ref 13.2–17.1)
IRON BINDING CAPACITY: 449 MCG/DL (CALC) (ref 250–425)
IRON, TOTAL: 76 MCG/DL (ref 50–195)
LDL-CHOLESTEROL: 123 MG/DL (CALC)
LYMPHOCYTES: 34.3 %
MCH: 26.6 PG (ref 27–33)
MCHC: 31.9 G/DL (ref 32–36)
MCV: 83.4 FL (ref 80–100)
MONOCYTES: 8 %
MPV: 11.4 FL (ref 7.5–12.5)
NEUTROPHILS: 55.7 %
NON-HDL CHOLESTEROL: 164 MG/DL (CALC)
PLATELET COUNT: 223 THOUSAND/UL (ref 140–400)
POTASSIUM: 4.2 MMOL/L (ref 3.5–5.3)
PROTEIN, TOTAL: 7.6 G/DL (ref 6.1–8.1)
RDW: 13.2 % (ref 11–15)
RED BLOOD CELL COUNT: 4.89 MILLION/UL (ref 4.2–5.8)
SODIUM: 140 MMOL/L (ref 135–146)
TRIGLYCERIDES: 268 MG/DL
TSH W/REFLEX TO FT4: 1.66 MIU/L (ref 0.4–4.5)
WHITE BLOOD CELL COUNT: 4.6 THOUSAND/UL (ref 3.8–10.8)

## 2024-07-23 NOTE — TELEPHONE ENCOUNTER
Rec labs from [facility].    Collected 24  Iron, total: 76  Iron binding capacity: 449  % saturation: 17  Ferritin: 6    Rest of labs already in epic.      When I spoke to [facility] yesterday They have patient listed as gino russell w/[facility] and do not have [facility] for last name also. Verified  and address. Will confirm w/patient correct name and [facility] indicated we can call them to request form to have this updated if needed.    Placed in provider bin for review. Thanks!

## 2024-07-23 NOTE — TELEPHONE ENCOUNTER
Patient aware. Verbalizes understanding. Patient confirmed name. Called [facility] to add \"fountain\" to patient info with them. Info added.

## 2024-07-23 NOTE — TELEPHONE ENCOUNTER
Noted. Results reviewed.       Cholesterol is elevated. Follow low fat/chol, low carb diet and regular exercise.    CBC with mild anemia. Ferritin is low. Start Ferrous Sulfate 325 mg BID, update med list. Take with vitamin C and avoid any dairy products 2 hours before and after supplement. This can sometimes cause constipation. Drink adequate fluids and increase fiber intake. If needed, can take OTC colace. If constipation persists, can decrease the iron supplement to once daily. Repeat CBC, iron, ferritin, tibc in 3 months.    Rest of labs stable with no worrisome findings.

## (undated) NOTE — LETTER
07/22/21    [address] [place] [address], 1 [facility]    Dear Marty Sanchez,    [facility] records indicate that you have outstanding lab work. To schedule an appointment please call Central Scheduling at [phone].  You may also sched